# Patient Record
Sex: FEMALE | Race: NATIVE HAWAIIAN OR OTHER PACIFIC ISLANDER | NOT HISPANIC OR LATINO | Employment: FULL TIME | URBAN - METROPOLITAN AREA
[De-identification: names, ages, dates, MRNs, and addresses within clinical notes are randomized per-mention and may not be internally consistent; named-entity substitution may affect disease eponyms.]

---

## 2019-07-20 ENCOUNTER — APPOINTMENT (OUTPATIENT)
Dept: RADIOLOGY | Facility: MEDICAL CENTER | Age: 19
End: 2019-07-20
Attending: EMERGENCY MEDICINE
Payer: OTHER MISCELLANEOUS

## 2019-07-20 ENCOUNTER — APPOINTMENT (OUTPATIENT)
Dept: RADIOLOGY | Facility: MEDICAL CENTER | Age: 19
End: 2019-07-20
Payer: OTHER MISCELLANEOUS

## 2019-07-20 ENCOUNTER — HOSPITAL ENCOUNTER (EMERGENCY)
Facility: MEDICAL CENTER | Age: 19
End: 2019-07-20
Attending: EMERGENCY MEDICINE
Payer: OTHER MISCELLANEOUS

## 2019-07-20 VITALS
OXYGEN SATURATION: 96 % | TEMPERATURE: 97.1 F | BODY MASS INDEX: 28.72 KG/M2 | SYSTOLIC BLOOD PRESSURE: 114 MMHG | HEIGHT: 67 IN | RESPIRATION RATE: 17 BRPM | DIASTOLIC BLOOD PRESSURE: 72 MMHG | WEIGHT: 183 LBS | HEART RATE: 87 BPM

## 2019-07-20 DIAGNOSIS — S02.2XXA CLOSED FRACTURE OF NASAL BONE, INITIAL ENCOUNTER: ICD-10-CM

## 2019-07-20 LAB — HCG SERPL QL: NEGATIVE

## 2019-07-20 PROCEDURE — 307740 HCHG GREEN TRAUMA TEAM SERVICES

## 2019-07-20 PROCEDURE — 72125 CT NECK SPINE W/O DYE: CPT

## 2019-07-20 PROCEDURE — 70486 CT MAXILLOFACIAL W/O DYE: CPT

## 2019-07-20 PROCEDURE — 99284 EMERGENCY DEPT VISIT MOD MDM: CPT

## 2019-07-20 PROCEDURE — 70450 CT HEAD/BRAIN W/O DYE: CPT

## 2019-07-20 PROCEDURE — 700111 HCHG RX REV CODE 636 W/ 250 OVERRIDE (IP)

## 2019-07-20 PROCEDURE — 90471 IMMUNIZATION ADMIN: CPT

## 2019-07-20 PROCEDURE — 84703 CHORIONIC GONADOTROPIN ASSAY: CPT

## 2019-07-20 PROCEDURE — 71045 X-RAY EXAM CHEST 1 VIEW: CPT

## 2019-07-20 PROCEDURE — 90715 TDAP VACCINE 7 YRS/> IM: CPT

## 2019-07-20 RX ADMIN — CLOSTRIDIUM TETANI TOXOID ANTIGEN (FORMALDEHYDE INACTIVATED), CORYNEBACTERIUM DIPHTHERIAE TOXOID ANTIGEN (FORMALDEHYDE INACTIVATED), BORDETELLA PERTUSSIS TOXOID ANTIGEN (GLUTARALDEHYDE INACTIVATED), BORDETELLA PERTUSSIS FILAMENTOUS HEMAGGLUTININ ANTIGEN (FORMALDEHYDE INACTIVATED), BORDETELLA PERTUSSIS PERTACTIN ANTIGEN, AND BORDETELLA PERTUSSIS FIMBRIAE 2/3 ANTIGEN 0.5 ML: 5; 2; 2.5; 5; 3; 5 INJECTION, SUSPENSION INTRAMUSCULAR at 05:00
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
## 2019-07-20 NOTE — ED NOTES
Pt back from ct scan, aaox4, states she has a headache. Pt denies needs at this time, updated on POC, will continue to monitor.

## 2019-07-20 NOTE — ED NOTES
Pt given discharge and follow up instructions, all questions answered, pt verbalized understanding. Pt discharged with PD. Copy of discharge provided to pt. Signed copy in chart.  Pt states that all personal belongings are in possession. Pt escorted off unit with PD.

## 2021-05-13 ENCOUNTER — OFFICE VISIT (OUTPATIENT)
Dept: URGENT CARE | Facility: PHYSICIAN GROUP | Age: 21
End: 2021-05-13
Payer: COMMERCIAL

## 2021-05-13 VITALS
OXYGEN SATURATION: 95 % | WEIGHT: 190 LBS | HEIGHT: 67 IN | RESPIRATION RATE: 16 BRPM | HEART RATE: 75 BPM | TEMPERATURE: 97.3 F | DIASTOLIC BLOOD PRESSURE: 78 MMHG | BODY MASS INDEX: 29.82 KG/M2 | SYSTOLIC BLOOD PRESSURE: 118 MMHG

## 2021-05-13 DIAGNOSIS — N92.6 IRREGULAR MENSTRUAL BLEEDING: ICD-10-CM

## 2021-05-13 LAB
INT CON NEG: NORMAL
INT CON POS: NORMAL
POC URINE PREGNANCY TEST: NEGATIVE

## 2021-05-13 PROCEDURE — 81025 URINE PREGNANCY TEST: CPT | Performed by: NURSE PRACTITIONER

## 2021-05-13 PROCEDURE — 99203 OFFICE O/P NEW LOW 30 MIN: CPT | Performed by: NURSE PRACTITIONER

## 2021-05-13 ASSESSMENT — ENCOUNTER SYMPTOMS
CHILLS: 0
DIZZINESS: 0
NAUSEA: 0
ABDOMINAL PAIN: 0
VOMITING: 0
SHORTNESS OF BREATH: 0
PALPITATIONS: 0
FEVER: 0

## 2021-05-13 NOTE — PATIENT INSTRUCTIONS
Abnormal Uterine Bleeding  Abnormal uterine bleeding is unusual bleeding from the uterus. It includes:  · Bleeding or spotting between periods.  · Bleeding after sex.  · Bleeding that is heavier than normal.  · Periods that last longer than usual.  · Bleeding after menopause.  Abnormal uterine bleeding can affect women at various stages in life, including teenagers, women in their reproductive years, pregnant women, and women who have reached menopause. Common causes of abnormal uterine bleeding include:  · Pregnancy.  · Growths of tissue (polyps).  · A noncancerous tumor in the uterus (fibroid).  · Infection.  · Cancer.  · Hormonal imbalances.  Any type of abnormal bleeding should be evaluated by a health care provider. Many cases are minor and simple to treat, while others are more serious. Treatment will depend on the cause of the bleeding.  Follow these instructions at home:  · Monitor your condition for any changes.  · Do not use tampons, douche, or have sex if told by your health care provider.  · Change your pads often.  · Get regular exams that include pelvic exams and cervical cancer screening.  · Keep all follow-up visits as told by your health care provider. This is important.  Contact a health care provider if:  · Your bleeding lasts for more than one week.  · You feel dizzy at times.  · You feel nauseous or you vomit.  Get help right away if:  · You pass out.  · Your bleeding soaks through a pad every hour.  · You have abdominal pain.  · You have a fever.  · You become sweaty or weak.  · You pass large blood clots from your vagina.  Summary  · Abnormal uterine bleeding is unusual bleeding from the uterus.  · Any type of abnormal bleeding should be evaluated by a health care provider. Many cases are minor and simple to treat, while others are more serious.  · Treatment will depend on the cause of the bleeding.  This information is not intended to replace advice given to you by your health care provider.  Make sure you discuss any questions you have with your health care provider.  Document Released: 12/18/2006 Document Revised: 03/27/2019 Document Reviewed: 01/19/2018  Elsevier Patient Education © 2020 [employer]

## 2021-05-13 NOTE — PROGRESS NOTES
Subjective:     Remy Goodrich is a 21 y.o. female who presents for Menstrual Problem (x3-4 weeks. period nonstop )      Vaginal bleeding started 3rd week of April, when her menstrual cycle was due to start. Has had vaginal bleeding since, daily. Currently light. Was heavier a few days ago. Hx of regular menses prior to this, lasting 1 week. No SOB or dizziness. No vaginal discharge. No dysuria. No new meds, diet changes, changes in exercise routine, or recent illness. Sexually active. . Does not currently have an OB/GYN or PCP. No hx of thyroid or metabolic disorders. No hx of STI. Not currently on birth control.    Menstrual Problem  The patient's primary symptoms include vaginal bleeding. The patient's pertinent negatives include no genital itching, genital rash, pelvic pain or vaginal discharge. This is a new problem. The current episode started 1 to 4 weeks ago. The problem has been waxing and waning. The patient is experiencing no pain. She is not pregnant. Pertinent negatives include no abdominal pain, chills, dysuria, fever, frequency, nausea or vomiting. No, her partner does not have an STD.       History reviewed. No pertinent past medical history.    History reviewed. No pertinent surgical history.    Social History     Socioeconomic History   • Marital status: Single     Spouse name: Not on file   • Number of children: Not on file   • Years of education: Not on file   • Highest education level: Not on file   Occupational History   • Not on file   Tobacco Use   • Smoking status: Never Smoker   • Smokeless tobacco: Never Used   • Tobacco comment: vape    Vaping Use   • Vaping Use: Every day   Substance and Sexual Activity   • Alcohol use: Yes   • Drug use: No   • Sexual activity: Not on file   Other Topics Concern   • Not on file   Social History Narrative   • Not on file     Social Determinants of Health     Financial Resource Strain:    • Difficulty of Paying Living Expenses:    Food Insecurity:   "  • Worried About Running Out of Food in the Last Year:    • Ran Out of Food in the Last Year:    Transportation Needs:    • Lack of Transportation (Medical):    • Lack of Transportation (Non-Medical):    Physical Activity:    • Days of Exercise per Week:    • Minutes of Exercise per Session:    Stress:    • Feeling of Stress :    Social Connections:    • Frequency of Communication with Friends and Family:    • Frequency of Social Gatherings with Friends and Family:    • Attends Muslim Services:    • Active Member of Clubs or Organizations:    • Attends Club or Organization Meetings:    • Marital Status:    Intimate Partner Violence:    • Fear of Current or Ex-Partner:    • Emotionally Abused:    • Physically Abused:    • Sexually Abused:         History reviewed. No pertinent family history.     No Known Allergies    Review of Systems   Constitutional: Negative for chills and fever.   Respiratory: Negative for shortness of breath.    Cardiovascular: Negative for chest pain and palpitations.   Gastrointestinal: Negative for abdominal pain, nausea and vomiting.   Genitourinary: Positive for menstrual problem. Negative for dysuria, frequency, pelvic pain and vaginal discharge.   Neurological: Negative for dizziness.   All other systems reviewed and are negative.       Objective:   /78   Pulse 75   Temp 36.3 °C (97.3 °F) (Temporal)   Resp 16   Ht 1.702 m (5' 7\")   Wt 86.2 kg (190 lb)   SpO2 95%   BMI 29.76 kg/m²     Physical Exam  Vitals reviewed.   Constitutional:       General: She is not in acute distress.     Appearance: She is well-developed.   HENT:      Head: Normocephalic and atraumatic.      Right Ear: External ear normal.      Left Ear: External ear normal.      Nose: Nose normal.      Mouth/Throat:      Mouth: Mucous membranes are moist.   Eyes:      Conjunctiva/sclera: Conjunctivae normal.   Cardiovascular:      Rate and Rhythm: Normal rate.   Pulmonary:      Effort: Pulmonary effort is " normal.   Abdominal:      General: Bowel sounds are normal. There is no distension.      Palpations: Abdomen is soft.      Tenderness: There is no abdominal tenderness.   Musculoskeletal:         General: Normal range of motion.      Cervical back: Normal range of motion.   Skin:     General: Skin is warm and dry.      Coloration: Skin is not pale.   Neurological:      General: No focal deficit present.      Mental Status: She is alert and oriented to person, place, and time.      GCS: GCS eye subscore is 4. GCS verbal subscore is 5. GCS motor subscore is 6.   Psychiatric:         Mood and Affect: Mood normal.         Speech: Speech normal.         Behavior: Behavior normal.         Thought Content: Thought content normal.         Judgment: Judgment normal.         Assessment/Plan:   1. Irregular menstrual bleeding  - POCT Pregnancy  - REFERRAL TO GYNECOLOGY  - US-PELVIC COMPLETE (TRANSABDOMINAL/TRANSVAGINAL) (COMBO); Future    Results for orders placed or performed in visit on 05/13/21   POCT Pregnancy   Result Value Ref Range    POC Urine Pregnancy Test NEGATIVE Negative    Internal Control Positive Valid     Internal Control Negative Valid    -Oral hydration.  -Ibuprofen for pain.    Follow up emergently for dizziness, syncope, tachycardia, SOB, abdominal pain. Discussed imaging to r/o mass or other uterine abnormality. Differential to include homonal or metabolic imbalance. Establish care with GYN. Stable vitals.     Differential diagnosis, natural history, supportive care, and indications for immediate follow-up discussed.

## 2021-05-14 ENCOUNTER — TELEPHONE (OUTPATIENT)
Dept: URGENT CARE | Facility: CLINIC | Age: 21
End: 2021-05-14

## 2021-05-14 ENCOUNTER — HOSPITAL ENCOUNTER (OUTPATIENT)
Dept: RADIOLOGY | Facility: MEDICAL CENTER | Age: 21
End: 2021-05-14
Attending: NURSE PRACTITIONER
Payer: COMMERCIAL

## 2021-05-14 DIAGNOSIS — N92.6 IRREGULAR MENSTRUAL BLEEDING: ICD-10-CM

## 2021-05-14 PROCEDURE — 76830 TRANSVAGINAL US NON-OB: CPT

## 2021-05-14 NOTE — TELEPHONE ENCOUNTER
IMPRESSION:        1. Normal uterus and right ovary. Normal endometrial thickness.  2. Left ovary was not visualized.